# Patient Record
Sex: FEMALE | Race: WHITE | NOT HISPANIC OR LATINO | URBAN - METROPOLITAN AREA
[De-identification: names, ages, dates, MRNs, and addresses within clinical notes are randomized per-mention and may not be internally consistent; named-entity substitution may affect disease eponyms.]

---

## 2018-08-07 ENCOUNTER — TRANSCRIBE ORDERS (OUTPATIENT)
Dept: ADMINISTRATIVE | Facility: HOSPITAL | Age: 33
End: 2018-08-07

## 2018-08-07 DIAGNOSIS — E66.9 OBESITY, UNSPECIFIED CLASSIFICATION, UNSPECIFIED OBESITY TYPE, UNSPECIFIED WHETHER SERIOUS COMORBIDITY PRESENT: Primary | ICD-10-CM

## 2018-09-25 ENCOUNTER — OFFICE VISIT (OUTPATIENT)
Dept: SLEEP CENTER | Facility: CLINIC | Age: 33
End: 2018-09-25
Payer: COMMERCIAL

## 2018-09-25 VITALS
WEIGHT: 293 LBS | SYSTOLIC BLOOD PRESSURE: 112 MMHG | BODY MASS INDEX: 51.91 KG/M2 | HEART RATE: 89 BPM | DIASTOLIC BLOOD PRESSURE: 70 MMHG | HEIGHT: 63 IN

## 2018-09-25 DIAGNOSIS — E66.9 OBESITY, UNSPECIFIED CLASSIFICATION, UNSPECIFIED OBESITY TYPE, UNSPECIFIED WHETHER SERIOUS COMORBIDITY PRESENT: ICD-10-CM

## 2018-09-25 PROCEDURE — 99204 OFFICE O/P NEW MOD 45 MIN: CPT | Performed by: INTERNAL MEDICINE

## 2018-09-25 RX ORDER — ESOMEPRAZOLE MAGNESIUM 40 MG/1
40 CAPSULE, DELAYED RELEASE ORAL DAILY
Refills: 2 | COMMUNITY
Start: 2018-07-27

## 2018-09-25 RX ORDER — NORGESTIMATE AND ETHINYL ESTRADIOL 7DAYSX3 28
1 KIT ORAL DAILY
Refills: 3 | COMMUNITY
Start: 2018-07-17

## 2018-09-25 NOTE — PROGRESS NOTES
Consultation - [id number] : 1985  MRN: [id number]      Assessment:  The patient's symptoms are not consistent with obstructive sleep apnea  Plan:  No testing is required  Follow up:  As needed    History of Present Illness:   26 y  o female with morbid obesity who is undergoing evaluation for bariatric surgery  With regards to possible obstructive sleep apnea, she denies snoring, witnessed apneas, excessive daytime sleepiness or any cardiovascular disease  She feels that she is a sound sleeper and does not fall asleep inappropriately    She denies any medical problems    Review of Systems:        Genitourinary none   Cardiology none   Gastrointestinal frequent heartburn/acid reflux   Neurology none   Constitutional fatigue and weight change   Integumentary none   Psychiatry anxiety   Musculoskeletal muscle aches, back pain and leg cramps   Pulmonary none   ENT none   Endocrine none   Hematological none           Historical Information    Past Medical History:  None reported    Past Surgical History:  None reported    Family History: non-contributory      Sleep Schedule: unremarkable    Snoring:    No      Witnessed Apnea:    No    Medications/Allergies:    Current Outpatient Prescriptions:     esomeprazole (NexIUM) 40 MG capsule, Take 40 mg by mouth daily, Disp: , Rfl: 2    TRI FEMYNOR 0 18/0 215/0 25 MG-35 MCG per tablet, Take 1 tablet by mouth daily, Disp: , Rfl: 3        Zo Tang  2018  2:16 PM  Sign at close encounter    Review of Systems      Genitourinary none   Cardiology none   Gastrointestinal frequent heartburn/acid reflux   Neurology none   Constitutional fatigue and weight change   Integumentary none   Psychiatry anxiety   Musculoskeletal muscle aches, back pain and leg cramps   Pulmonary none   ENT none   Endocrine none   Hematological none                         Objective:    Vital Signs:   Vitals:    18 1400   BP: 112/70   Pulse: 89   Weight: (!) 161 kg (355 lb)   Height: 5' 3" (1 6 m)     Neck Circumference: 35      [place] Sleepiness Scale: Total score: 4    Physical Exam:    General: Alert, appropriate, cooperative, overweight    Head: NC/AT, no retrognathia    Nose: No septal deviation, nares not obstructed, mucosa normal    Throat: Airway lumen narrowed, tongue base thickened, no tonsils visualized    Neck: Normal, no thyromegaly or lymphadenopathy, no JVD    Heart: RR, normal S1 and S2, no murmurs    Chest: Clear bilaterally    Extremity: No clubbing, cyanosis, no edema    Skin: Warm, dry    Neuro: No motor abnormalities, cranial nerves appear intact    Counseling / Coordination of Care  Total clinic time spent today 25 minutes  We discussed the pathophysiology of obstructive sleep apnea as well as the possible treatment options  We also discussed the rationale for positive airway pressure therapy  CLAUDIA Harry    Board Certified Sleep Specialist